# Patient Record
Sex: MALE
[De-identification: names, ages, dates, MRNs, and addresses within clinical notes are randomized per-mention and may not be internally consistent; named-entity substitution may affect disease eponyms.]

---

## 2021-03-29 ENCOUNTER — NURSE TRIAGE (OUTPATIENT)
Dept: OTHER | Facility: CLINIC | Age: 66
End: 2021-03-29

## 2021-03-30 NOTE — TELEPHONE ENCOUNTER
Call received on [address]. Patient in [place]     Brief description of triage:no exposure but COVID symptoms. Did have Betha Spikes 3 weeks ago. Triage indicates for patient to call PCP within 24 hours. Care advice provided. Recommended using local [facility] of health website for testing locations and up to date information. Caller verbalizes understanding, denies any other questions or concerns; instructed to call back for any new or worsening symptoms. Reason for Disposition   [1] Fever returns after gone for over 24 hours AND [2] symptoms worse or not improved    Answer Assessment - Initial Assessment Questions  1. COVID-19 DIAGNOSIS: \"Who made your Coronavirus (COVID-19) diagnosis? \" \"Was it confirmed by a positive lab test?\" If not diagnosed by a HCP, ask \"Are there lots of cases (community spread) where you live? \" (See public health department website, if unsure)      Denies    2. COVID-19 EXPOSURE: \"Was there any known exposure to COVID before the symptoms began? \" CDC Definition of close contact: within 6 feet (2 meters) for a total of 15 minutes or more over a 24-hour period. Denies    3. ONSET: \"When did the COVID-19 symptoms start? \"       3 days ago    4. WORST SYMPTOM: \"What is your worst symptom? \" (e.g., cough, fever, shortness of breath, muscle aches)       Diarrhea- does not go a lot but is about 4 stools per day  The first couple of days he was going repeatedly    5. COUGH: \"Do you have a cough? \" If so, ask: \"How bad is the cough? \"       Denies    6. FEVER: \"Do you have a fever? \" If so, ask: \"What is your temperature, how was it measured, and when did it start? \"        98.8 with Tylenol and Ibuprofen. Without is 100.0 . 3 days ago 101.6    7. RESPIRATORY STATUS: \"Describe your breathing? \" (e.g., shortness of breath, wheezing, unable to speak)       Denies    8. BETTER-SAME-WORSE: Heydi Freshwater you getting better, staying the same or getting worse compared to yesterday? \"  If getting worse, ask, \"In what way?\"        9. HIGH RISK DISEASE: \"Do you have any chronic medical problems? \" (e.g., asthma, heart or lung disease, weak immune system, obesity, etc.)      Denies asthma/heart disease/ weak immune system/obesity    10. PREGNANCY: \"Is there any chance you are pregnant? \" \"When was your last menstrual period? \"        N/a male    6. OTHER SYMPTOMS: \"Do you have any other symptoms? \"  (e.g., chills, fatigue, headache, loss of smell or taste, muscle pain, sore throat; new loss of smell or taste especially support the diagnosis of COVID-19)        See travel screen    Protocols used: CORONAVIRUS (COVID-19) DIAGNOSED OR SUSPECTED-ADULT-AH  see above documentation